# Patient Record
Sex: MALE | Race: WHITE | NOT HISPANIC OR LATINO | Employment: OTHER | URBAN - METROPOLITAN AREA
[De-identification: names, ages, dates, MRNs, and addresses within clinical notes are randomized per-mention and may not be internally consistent; named-entity substitution may affect disease eponyms.]

---

## 2024-07-22 ENCOUNTER — OFFICE VISIT (OUTPATIENT)
Dept: OBGYN CLINIC | Facility: CLINIC | Age: 64
End: 2024-07-22
Payer: COMMERCIAL

## 2024-07-22 VITALS
TEMPERATURE: 98.1 F | OXYGEN SATURATION: 96 % | DIASTOLIC BLOOD PRESSURE: 94 MMHG | BODY MASS INDEX: 27.9 KG/M2 | HEART RATE: 95 BPM | HEIGHT: 72 IN | SYSTOLIC BLOOD PRESSURE: 146 MMHG | WEIGHT: 206 LBS

## 2024-07-22 DIAGNOSIS — M25.511 CHRONIC RIGHT SHOULDER PAIN: ICD-10-CM

## 2024-07-22 DIAGNOSIS — G89.29 CHRONIC RIGHT SHOULDER PAIN: ICD-10-CM

## 2024-07-22 DIAGNOSIS — M75.51 BURSITIS OF RIGHT SHOULDER: Primary | ICD-10-CM

## 2024-07-22 PROCEDURE — 99203 OFFICE O/P NEW LOW 30 MIN: CPT | Performed by: ORTHOPAEDIC SURGERY

## 2024-07-22 PROCEDURE — 20610 DRAIN/INJ JOINT/BURSA W/O US: CPT | Performed by: ORTHOPAEDIC SURGERY

## 2024-07-22 RX ORDER — TRIAMCINOLONE ACETONIDE 40 MG/ML
80 INJECTION, SUSPENSION INTRA-ARTICULAR; INTRAMUSCULAR
Status: COMPLETED | OUTPATIENT
Start: 2024-07-22 | End: 2024-07-22

## 2024-07-22 RX ORDER — BUPIVACAINE HYDROCHLORIDE 2.5 MG/ML
4 INJECTION, SOLUTION INFILTRATION; PERINEURAL
Status: COMPLETED | OUTPATIENT
Start: 2024-07-22 | End: 2024-07-22

## 2024-07-22 RX ADMIN — BUPIVACAINE HYDROCHLORIDE 4 ML: 2.5 INJECTION, SOLUTION INFILTRATION; PERINEURAL at 15:15

## 2024-07-22 RX ADMIN — TRIAMCINOLONE ACETONIDE 80 MG: 40 INJECTION, SUSPENSION INTRA-ARTICULAR; INTRAMUSCULAR at 15:15

## 2024-07-22 NOTE — PROGRESS NOTES
Assessment/Plan:   Diagnoses and all orders for this visit:    Bursitis of right shoulder  -     Large joint arthrocentesis: R subacromial bursa    Chronic right shoulder pain  -     XR shoulder 2+ vw right; Future  -     Large joint arthrocentesis: R subacromial bursa         Discussed with patient that his exam is consistent with bursitis of the right shoulder. He was offered and accepted an injection(s) of Kenalog and Marcaine to his Right shoulder(s) for symptomatic relief of pain and inflammation. Patient tolerated the treatment(s) well. Ice and post injection protocol advised. Weightbearing activities as tolerated. Consideration for further imaging if symptoms do not improve. He will be seen for follow-up in 6 weeks for re-evaluation and consideration for repeat injections as necessary. Patient expresses understanding and is in agreement with this treatment plan. The patient was given the opportunity to ask questions or present concerns.    The patient has evidence of rotator cuff tendinopathy.  There is also impingement.  Under aseptic technique, the shoulder was injected with Kenalog and Marcaine.  He does not want an MRI at this juncture.  Return back in 6 weeks evaluation.  If there is any further issues, an MRI may be warranted      Subjective:   Patient ID: Mj Yates  1960     HPI  Patient is a 63 y.o. male who presents for initial evaluation of his right shoulder. The patient states that he was lying on his right shoulder when he noticed pain in the right shoulder. He states that the pain may have been incited 2 years ago by pulling on a spring machine, however, his shoulder was beginning to feel better before this incident. The patient is a competitive bowler. He states that he is only able to throw the ball approximately 9mph, when on average he throws 14-15mph. He reports feelings on weakness. He states that the he has trouble with overhead activity, pushing, and pulling. His pain is  "the worst with abduction.     The following portions of the patient's history were reviewed and updated as appropriate:  Past medical history, past surgical history, Family history, social history, current medications and allergies    History reviewed. No pertinent past medical history.    History reviewed. No pertinent surgical history.    History reviewed. No pertinent family history.    Social History     Socioeconomic History    Marital status: /Civil Union     Spouse name: None    Number of children: None    Years of education: None    Highest education level: None   Occupational History    None   Tobacco Use    Smoking status: Never    Smokeless tobacco: Never   Substance and Sexual Activity    Alcohol use: Yes     Comment: rare    Drug use: Never    Sexual activity: None   Other Topics Concern    None   Social History Narrative    None     Social Determinants of Health     Financial Resource Strain: Not on file   Food Insecurity: Not on file   Transportation Needs: Not on file   Physical Activity: Not on file   Stress: Not on file   Social Connections: Not on file   Intimate Partner Violence: Not on file   Housing Stability: Not on file       No current outpatient medications on file.    Allergies   Allergen Reactions    Sulfa Antibiotics Other (See Comments)     \"red as a lobster\"    Indomethacin Rash       Review of Systems   Constitutional:  Negative for chills and fever.   HENT:  Negative for ear pain and sore throat.    Eyes:  Negative for pain and visual disturbance.   Respiratory:  Negative for cough and shortness of breath.    Cardiovascular:  Negative for chest pain and palpitations.   Gastrointestinal:  Negative for abdominal pain and vomiting.   Genitourinary:  Negative for dysuria and hematuria.   Musculoskeletal:  Negative for arthralgias and back pain.   Skin:  Negative for color change and rash.   Neurological:  Negative for seizures and syncope.   All other systems reviewed and are " negative.       Objective:  /94   Pulse 95   Temp 98.1 °F (36.7 °C) (Tympanic)   Ht 6' (1.829 m)   Wt 93.4 kg (206 lb)   SpO2 96%   BMI 27.94 kg/m²     Ortho Exam  right Shoulder -   No anatomical deformity  Skin is warm and dry to touch with no signs of erythema, ecchymosis, or infection  No soft tissue swelling or effusion noted  Palpable crepitus with passive motion  TTP over upper trapezius, subacromial bursa   ROM °, ABD 80°, ER 60°  Strength: 4+/5 abduction, 5/5 forward flexion   No glenohumeral instability appreciated on exam  - Neer's, - Bravo  - empty can with pain, - drop-arm, - resisted external rotation, - belly press, - lift-off   Demonstrates normal elbow, wrist, and finger motion  2+  distal radial pulse with brisk capillary refill to the fingers  Radial, median, ulnar and motor  and sensory distribution intact  Sensation to light touch intact distally      Physical Exam  HENT:      Head: Normocephalic and atraumatic.      Nose: Nose normal.   Eyes:      Conjunctiva/sclera: Conjunctivae normal.   Cardiovascular:      Rate and Rhythm: Normal rate.   Pulmonary:      Effort: Pulmonary effort is normal.   Musculoskeletal:      Cervical back: Neck supple.   Skin:     General: Skin is warm and dry.      Capillary Refill: Capillary refill takes less than 2 seconds.   Neurological:      Mental Status: He is alert and oriented to person, place, and time.   Psychiatric:         Mood and Affect: Mood normal.         Behavior: Behavior normal.          Diagnostic Test Review:  X-Ray of right shoulder obtained on 7/22/2024 were reviewed and demonstrate subacromial bone spurs present.      Large joint arthrocentesis: R subacromial bursa  Universal Protocol:  Consent: Verbal consent obtained.  Risks and benefits: risks, benefits and alternatives were discussed  Consent given by: patient  Timeout called at: 7/22/2024 3:30 PM.  Patient understanding: patient states understanding of the procedure  being performed  Patient consent: the patient's understanding of the procedure matches consent given  Site marked: the operative site was marked  Radiology Images displayed and confirmed. If images not available, report reviewed: imaging studies available  Patient identity confirmed: verbally with patient  Supporting Documentation  Indications: pain and joint swelling   Procedure Details  Location: shoulder - R subacromial bursa  Needle gauge: 21 G.  Ultrasound guidance: no  Approach: posterolateral  Medications administered: 4 mL bupivacaine 0.25 %; 80 mg triamcinolone acetonide 40 mg/mL    Patient tolerance: patient tolerated the procedure well with no immediate complications  Dressing:  Sterile dressing applied             Scribe Attestation      I,:  No Shaffer am acting as a scribe while in the presence of the attending physician.:       I,:  Chandu Oliver DO personally performed the services described in this documentation    as scribed in my presence.:

## 2024-09-03 ENCOUNTER — OFFICE VISIT (OUTPATIENT)
Dept: OBGYN CLINIC | Facility: CLINIC | Age: 64
End: 2024-09-03
Payer: COMMERCIAL

## 2024-09-03 ENCOUNTER — TELEPHONE (OUTPATIENT)
Age: 64
End: 2024-09-03

## 2024-09-03 VITALS
WEIGHT: 206 LBS | DIASTOLIC BLOOD PRESSURE: 98 MMHG | SYSTOLIC BLOOD PRESSURE: 162 MMHG | HEIGHT: 72 IN | BODY MASS INDEX: 27.9 KG/M2

## 2024-09-03 DIAGNOSIS — M24.811 INTERNAL DERANGEMENT OF RIGHT SHOULDER: Primary | ICD-10-CM

## 2024-09-03 PROCEDURE — 99213 OFFICE O/P EST LOW 20 MIN: CPT | Performed by: ORTHOPAEDIC SURGERY

## 2024-09-03 NOTE — PROGRESS NOTES
Assessment/Plan:   Diagnoses and all orders for this visit:    Internal derangement of right shoulder  -     MRI shoulder right wo contrast; Future       The patient continues to experience pain, limited motion, and weakness in the right shoulder. An order was placed for an MRI to rule out a rotator cuff tear. Continue activities as tolerated. He may take OTC NSAIDs or Tylenol as needed for pain or soreness. He cindy follow-up once the MRI is complete. The patient expresses understanding and is in agreement with today's treatment plan.     The patient has evidence of a rotator cuff tear of his right shoulder.  There is pain and weakness.  He cannot flex and abduct past his body.  Would recommend obtaining an MRI.  Return back once test is complete          Subjective:   Patient ID: Mj Yates  1960     HPI  Patient is a 63 y.o. male who presents for follow-up evaluation of his right shoulder. The patient was last seen on 7/22/2024 where he received a cortisone injection to his right shoulder. He reports little improvements in pain. He states that his motion slightly approved, however, he continues to struggle with overhead motion. He states that he is able to bowl and lift to elbow height, however, he experiences significant pain and weakness over shoulder height. He denies numbness or tingling.      The following portions of the patient's history were reviewed and updated as appropriate:  Past medical history, past surgical history, Family history, social history, current medications and allergies    History reviewed. No pertinent past medical history.    Past Surgical History:   Procedure Laterality Date    ACHILLES TENDON REPAIR Right     approx 2009       History reviewed. No pertinent family history.    Social History     Socioeconomic History    Marital status: /Civil Union     Spouse name: None    Number of children: None    Years of education: None    Highest education level: None  "  Occupational History    None   Tobacco Use    Smoking status: Never    Smokeless tobacco: Never   Vaping Use    Vaping status: Never Used   Substance and Sexual Activity    Alcohol use: Yes     Comment: rare    Drug use: Never    Sexual activity: None   Other Topics Concern    None   Social History Narrative    None     Social Determinants of Health     Financial Resource Strain: Not on file   Food Insecurity: Not on file   Transportation Needs: Not on file   Physical Activity: Not on file   Stress: Not on file   Social Connections: Not on file   Intimate Partner Violence: Not on file   Housing Stability: Not on file       No current outpatient medications on file.    Allergies   Allergen Reactions    Sulfa Antibiotics Other (See Comments)     \"red as a lobster\"    Indomethacin Rash       Review of Systems   Constitutional:  Negative for chills and fever.   HENT:  Negative for ear pain and sore throat.    Eyes:  Negative for pain and visual disturbance.   Respiratory:  Negative for cough and shortness of breath.    Cardiovascular:  Negative for chest pain and palpitations.   Gastrointestinal:  Negative for abdominal pain and vomiting.   Genitourinary:  Negative for dysuria and hematuria.   Musculoskeletal:  Negative for arthralgias and back pain.   Skin:  Negative for color change and rash.   Neurological:  Negative for seizures and syncope.   All other systems reviewed and are negative.       Objective:  /98 (BP Location: Left arm, Patient Position: Sitting, Cuff Size: Large)   Ht 6' (1.829 m)   Wt 93.4 kg (206 lb) Comment: reported  BMI 27.94 kg/m²     Ortho Exam  right Shoulder -   No anatomical deformity  Skin is warm and dry to touch with no signs of erythema, ecchymosis, or infection  No soft tissue swelling or effusion noted  Palpable crepitus with passive motion  TTP over upper trapezius, subacromial bursa   ROM °, °, ER 60°  Strength: 4+/5 abduction, 5/5 forward flexion   No " glenohumeral instability appreciated on exam  - Neer's, - Bravo  + pain empty can with pain, - drop-arm, + resisted external rotation, - belly press, - lift-off   Demonstrates normal elbow, wrist, and finger motion  2+  distal radial pulse with brisk capillary refill to the fingers  Radial, median, ulnar and motor  and sensory distribution intact  Sensation to light touch intact distally      Physical Exam  HENT:      Head: Normocephalic and atraumatic.      Nose: Nose normal.   Eyes:      Conjunctiva/sclera: Conjunctivae normal.   Cardiovascular:      Rate and Rhythm: Normal rate.   Pulmonary:      Effort: Pulmonary effort is normal.   Musculoskeletal:      Cervical back: Neck supple.   Skin:     General: Skin is warm and dry.      Capillary Refill: Capillary refill takes less than 2 seconds.   Neurological:      Mental Status: He is alert and oriented to person, place, and time.   Psychiatric:         Mood and Affect: Mood normal.         Behavior: Behavior normal.          Diagnostic Test Review:  X-Ray of right shoulder obtained on 7/22/2024 were reviewed and demonstrate subacromial bone spurs present.        Scribe Attestation      I,:  No Shaffer am acting as a scribe while in the presence of the attending physician.:       I,:  Chandu Oliver, DO personally performed the services described in this documentation    as scribed in my presence.:

## 2024-09-19 ENCOUNTER — HOSPITAL ENCOUNTER (OUTPATIENT)
Dept: MRI IMAGING | Facility: HOSPITAL | Age: 64
End: 2024-09-19
Attending: ORTHOPAEDIC SURGERY
Payer: COMMERCIAL

## 2024-09-19 DIAGNOSIS — M24.811 INTERNAL DERANGEMENT OF RIGHT SHOULDER: ICD-10-CM

## 2024-09-19 PROCEDURE — 73221 MRI JOINT UPR EXTREM W/O DYE: CPT

## 2024-09-30 ENCOUNTER — OFFICE VISIT (OUTPATIENT)
Dept: OBGYN CLINIC | Facility: CLINIC | Age: 64
End: 2024-09-30
Payer: COMMERCIAL

## 2024-09-30 VITALS
HEIGHT: 72 IN | SYSTOLIC BLOOD PRESSURE: 171 MMHG | WEIGHT: 206 LBS | DIASTOLIC BLOOD PRESSURE: 110 MMHG | BODY MASS INDEX: 27.9 KG/M2 | HEART RATE: 80 BPM

## 2024-09-30 DIAGNOSIS — M24.811 INTERNAL DERANGEMENT OF RIGHT SHOULDER: Primary | ICD-10-CM

## 2024-09-30 DIAGNOSIS — M19.011 ARTHRITIS OF RIGHT SHOULDER REGION: ICD-10-CM

## 2024-09-30 PROCEDURE — 99213 OFFICE O/P EST LOW 20 MIN: CPT | Performed by: ORTHOPAEDIC SURGERY

## 2024-09-30 NOTE — PROGRESS NOTES
Assessment/Plan:   Diagnoses and all orders for this visit:    Internal derangement of right shoulder    Arthritis of right shoulder region       Reviewed with patient at time of visit that physical exam and imaging demonstrate bone spur and foreign body in Right shoulder. MRI also shows mild arthritis. Since patient states that the shoulder seems to have improved slightly, he would like to monitor symptoms at this point.     The patient has arthritis of his right shoulder with loose bodies.  After looking at the MRI, there is no rotator cuff tearing.  The patient states he is feeling better with his motion.  At this point would continue to monitor.  Follow-up on an as-needed basis.  If his condition changes, he would not hesitate to let us know    Subjective:   Patient ID: Mj Yates  1960     HPI  Patient is a 63 y.o. male who presents for follow up evaluation of right shoulder. He obtained an MRI on 9/19/2024.  Today, he notes that the pain has improved. He is able to lift his arm and has good strength.     The following portions of the patient's history were reviewed and updated as appropriate:  Past medical history, past surgical history, Family history, social history, current medications and allergies    History reviewed. No pertinent past medical history.    Past Surgical History:   Procedure Laterality Date    ACHILLES TENDON REPAIR Right     approx 2009       History reviewed. No pertinent family history.    Social History     Socioeconomic History    Marital status: /Civil Union     Spouse name: None    Number of children: None    Years of education: None    Highest education level: None   Occupational History    None   Tobacco Use    Smoking status: Never    Smokeless tobacco: Never   Vaping Use    Vaping status: Never Used   Substance and Sexual Activity    Alcohol use: Yes     Comment: rare    Drug use: Never    Sexual activity: None   Other Topics Concern    None   Social History  "Narrative    None     Social Determinants of Health     Financial Resource Strain: Not on file   Food Insecurity: Not on file   Transportation Needs: Not on file   Physical Activity: Not on file   Stress: Not on file   Social Connections: Not on file   Intimate Partner Violence: Not on file   Housing Stability: Not on file       No current outpatient medications on file.    Allergies   Allergen Reactions    Sulfa Antibiotics Other (See Comments)     \"red as a lobster\"    Indomethacin Rash       Review of Systems   Constitutional:  Negative for chills and fever.   HENT:  Negative for ear pain and sore throat.    Eyes:  Negative for pain and visual disturbance.   Respiratory:  Negative for cough and shortness of breath.    Cardiovascular:  Negative for chest pain and palpitations.   Gastrointestinal:  Negative for abdominal pain and vomiting.   Genitourinary:  Negative for dysuria and hematuria.   Musculoskeletal:  Negative for arthralgias and back pain.   Skin:  Negative for color change and rash.   Neurological:  Negative for seizures and syncope.   All other systems reviewed and are negative.       Objective:  BP (!) 171/110 Comment: not taking BP med  Pulse 80   Ht 6' (1.829 m)   Wt 93.4 kg (206 lb)   BMI 27.94 kg/m²     Ortho Exam  right Shoulder -   No anatomical deformity  Skin is warm and dry to touch with no signs of erythema, ecchymosis, or infection  No soft tissue swelling or effusion noted  Palpable crepitus with passive motion  TTP over subacromial bursa   ROM °, °, ER 75°  Strength: 5/5 throughout   No glenohumeral instability appreciated on exam  - Neer's, - Bravo   - O'toney's, - empty can, - drop-arm,   Demonstrates normal elbow, wrist, and finger motion  2+  distal radial pulse with brisk capillary refill to the fingers  Radial, median, ulnar and motor  and sensory distribution intact  Sensation to light touch intact distally      Physical Exam  Vitals and nursing note reviewed. "   Constitutional:       General: He is not in acute distress.     Appearance: He is well-developed.   HENT:      Head: Normocephalic and atraumatic.   Eyes:      Conjunctiva/sclera: Conjunctivae normal.   Cardiovascular:      Rate and Rhythm: Normal rate and regular rhythm.      Heart sounds: No murmur heard.  Pulmonary:      Effort: Pulmonary effort is normal. No respiratory distress.      Breath sounds: Normal breath sounds.   Abdominal:      Palpations: Abdomen is soft.      Tenderness: There is no abdominal tenderness.   Musculoskeletal:         General: No swelling.      Cervical back: Neck supple.   Skin:     General: Skin is warm and dry.      Capillary Refill: Capillary refill takes less than 2 seconds.   Neurological:      Mental Status: He is alert.   Psychiatric:         Mood and Affect: Mood normal.          Diagnostic Test Review:  MRI of right shoulder obtained on 9/19/2024 were reviewed and demonstrate  loose body and bone spurs . Mid arthritis      Procedures   None performed at today's visit    Scribe Attestation      I,:  Traci Hicks am acting as a scribe while in the presence of the attending physician.:       I,:  Chandu Oliver DO personally performed the services described in this documentation    as scribed in my presence.:

## 2025-05-21 ENCOUNTER — OFFICE VISIT (OUTPATIENT)
Dept: SURGERY | Facility: CLINIC | Age: 65
End: 2025-05-21
Payer: COMMERCIAL

## 2025-05-21 VITALS
HEIGHT: 72 IN | BODY MASS INDEX: 25.73 KG/M2 | TEMPERATURE: 98.1 F | HEART RATE: 84 BPM | WEIGHT: 190 LBS | DIASTOLIC BLOOD PRESSURE: 74 MMHG | OXYGEN SATURATION: 99 % | SYSTOLIC BLOOD PRESSURE: 126 MMHG

## 2025-05-21 DIAGNOSIS — K40.91 RECURRENT LEFT INGUINAL HERNIA: Primary | ICD-10-CM

## 2025-05-21 DIAGNOSIS — E08.69 DIABETES MELLITUS DUE TO UNDERLYING CONDITION WITH OTHER SPECIFIED COMPLICATION, WITH LONG-TERM CURRENT USE OF INSULIN (HCC): ICD-10-CM

## 2025-05-21 DIAGNOSIS — Z79.4 DIABETES MELLITUS DUE TO UNDERLYING CONDITION WITH OTHER SPECIFIED COMPLICATION, WITH LONG-TERM CURRENT USE OF INSULIN (HCC): ICD-10-CM

## 2025-05-21 PROCEDURE — 99204 OFFICE O/P NEW MOD 45 MIN: CPT | Performed by: SURGERY

## 2025-05-21 RX ORDER — SODIUM CHLORIDE, SODIUM LACTATE, POTASSIUM CHLORIDE, CALCIUM CHLORIDE 600; 310; 30; 20 MG/100ML; MG/100ML; MG/100ML; MG/100ML
125 INJECTION, SOLUTION INTRAVENOUS CONTINUOUS
OUTPATIENT
Start: 2025-05-21

## 2025-05-21 RX ORDER — BLOOD SUGAR DIAGNOSTIC
STRIP MISCELLANEOUS
COMMUNITY
Start: 2025-04-23

## 2025-05-21 RX ORDER — BLOOD-GLUCOSE METER
EACH MISCELLANEOUS
COMMUNITY
Start: 2025-03-15

## 2025-05-21 RX ORDER — LANCETS 30 GAUGE
EACH MISCELLANEOUS
COMMUNITY
Start: 2025-03-14

## 2025-05-21 NOTE — ASSESSMENT & PLAN NOTE
The patient is a pleasant 64-year-old male with a past medical history significant for diabetes mellitus presenting with a recurrent left inguinal hernia status post open repair in 2005 that did not [facility] for definitive treatment by laparoscopic mesh repair.    The options, benefits, risks and alternatives to laparoscopic inguinal herniorrhaphy with mesh were reviewed with the patient.    The patient understands that nonsurgical management for asymptomatic and minimally symptomatic patients is a reasonable alternative to surgery.    Specific risks related to anesthesia, bleeding, infection, the use of mesh, 1 to 3% lifetime risk of recurrence and 1% risk of iatrogenic bowel injury were reviewed with the patient.  The possibility of transitioning to a open repair was also discussed.    All questions answered to the satisfaction of the patient and informed written consent obtained to proceed.

## 2025-05-21 NOTE — PROGRESS NOTES
Name: Mj Yates      : 1960      MRN: [id number]  Encounter Provider: Meek Leon MD  Encounter Date: 2025   Encounter department: [facility] SURGERY [place]  :  Assessment & Plan  Recurrent left inguinal hernia  The patient is a pleasant 64-year-old male with a past medical history significant for diabetes mellitus presenting with a recurrent left inguinal hernia status post open repair in  that did not [facility] for definitive treatment by laparoscopic mesh repair.    The options, benefits, risks and alternatives to laparoscopic inguinal herniorrhaphy with mesh were reviewed with the patient.    The patient understands that nonsurgical management for asymptomatic and minimally symptomatic patients is a reasonable alternative to surgery.    Specific risks related to anesthesia, bleeding, infection, the use of mesh, 1 to 3% lifetime risk of recurrence and 1% risk of iatrogenic bowel injury were reviewed with the patient.  The possibility of transitioning to a open repair was also discussed.    All questions answered to the satisfaction of the patient and informed written consent obtained to proceed.               History of Present Illness   HPI  Mj Yates is a 64 y.o. male who presents for a left ing hernia that he has been dealing with for 1 year but in the last 2 mo the bulge has gotten bigger with pulling, burning, and stabbing pains. Pt states with lifting the pain worsens. Pt denies any discoloration to the groin skin and denies any issues with the testicle. Pt denies nausea/vomiting, diarrhea/ constipation, issues with urination, abd pain, trouble eating/drinking/swallowing or fevers/chills. Pt states he had a right or left ing hernia rpr when he was younger he doesn't remember which one. Pt denies any blood thinners but has allergies to Sulfa abx and Indomethacin. Sylvia.NERY,MA  History obtained from: patient    Review of Systems   Constitutional:   Negative for chills and fever.   HENT:  Negative for ear pain and sore throat.    Eyes:  Negative for pain and visual disturbance.   Respiratory:  Negative for cough and shortness of breath.    Cardiovascular:  Negative for chest pain and palpitations.   Gastrointestinal:  Negative for abdominal pain and vomiting.   Genitourinary:  Negative for dysuria and hematuria.   Musculoskeletal:  Negative for arthralgias and back pain.   Skin:  Negative for color change and rash.   Neurological:  Negative for seizures and syncope.   All other systems reviewed and are negative.    Pertinent Medical History            Medical History Reviewed by provider this encounter:     .  Past Medical History   Past Medical History:   Diagnosis Date    Diabetes (HCC)      Past Surgical History:   Procedure Laterality Date    ACHILLES TENDON REPAIR Right     approx 2009    APPENDECTOMY      1972     Family History   Problem Relation Age of Onset    No Known Problems Mother     Bone cancer Father     No Known Problems Son     No Known Problems Son       reports that he has never smoked. He has never used smokeless tobacco. He reports current alcohol use. He reports that he does not use drugs.  Current Outpatient Medications   Medication Instructions    Blood Glucose Monitoring Suppl (ONE TOUCH ULTRA 2) w/Device KIT use as directed TO TEST ONCE DAILY    Lancets (OneTouch Delica Plus [id number]) MISC use 1 LANCET to TEST BLOOD SUGAR once daily    metFORMIN (GLUCOPHAGE) 500 mg tablet 1 tablet, Oral, 2 times daily    OneTouch Ultra Test test strip USE ONE TEST STRIP TO TEST BLOOD SUGAR ONCE DAILY AS DIRECTED BY PRESCRIBER   Allergies[1]   Medications Ordered Prior to Encounter[2]   Social History     Tobacco Use    Smoking status: Never    Smokeless tobacco: Never   Vaping Use    Vaping status: Never Used   Substance and Sexual Activity    Alcohol use: Yes     Comment: rare    Drug use: Never    Sexual activity: Not on file        Objective   BP  "126/74 (BP Location: Left arm, Patient Position: Sitting, Cuff Size: Standard)   Pulse 84   Temp 98.1 °F (36.7 °C) (Temporal)   Ht 6' (1.829 m)   Wt 86.2 kg (190 lb)   SpO2 99%   BMI 25.77 kg/m²      Physical Exam  Vitals and nursing note reviewed.   Constitutional:       General: He is not in acute distress.     Appearance: He is well-developed.   HENT:      Head: Normocephalic and atraumatic.     Eyes:      Conjunctiva/sclera: Conjunctivae normal.       Cardiovascular:      Rate and Rhythm: Normal rate and regular rhythm.      Heart sounds: No murmur heard.  Pulmonary:      Effort: Pulmonary effort is normal. No respiratory distress.      Breath sounds: Normal breath sounds.   Abdominal:      Palpations: Abdomen is soft.      Tenderness: There is no abdominal tenderness.      Comments: Moderate-sized reducible left inguinal hernia present   Genitourinary:     Comments: Testes descended bilaterally    Musculoskeletal:         General: No swelling.      Cervical back: Neck supple.     Skin:     General: Skin is warm and dry.      Capillary Refill: Capillary refill takes less than 2 seconds.     Neurological:      Mental Status: He is alert.     Psychiatric:         Mood and Affect: Mood normal.         Administrative Statements   I have spent a total time of 20 minutes in caring for this patient on the day of the visit/encounter including Risks and benefits of tx options.       [1]   Allergies  Allergen Reactions    Sulfa Antibiotics Other (See Comments)     \"red as a lobster\"    Indomethacin Rash   [2]   Current Outpatient Medications on File Prior to Visit   Medication Sig Dispense Refill    Blood Glucose Monitoring Suppl (ONE TOUCH ULTRA 2) w/Device KIT use as directed TO TEST ONCE DAILY      Lancets (OneTouch Delica Plus [id number]) MISC use 1 LANCET to TEST BLOOD SUGAR once daily      metFORMIN (GLUCOPHAGE) 500 mg tablet Take 1 tablet by mouth in the morning and 1 tablet before bedtime.      OneTouch Ultra " Test test strip USE ONE TEST STRIP TO TEST BLOOD SUGAR ONCE DAILY AS DIRECTED BY PRESCRIBER       No current facility-administered medications on file prior to visit.

## 2025-05-21 NOTE — H&P
Name: Mj Yates      : 1960      MRN: [id number]  Encounter Provider: Meek Leon MD  Encounter Date: 2025   Encounter department: [facility] SURGERY [place]  :  Assessment & Plan  Recurrent left inguinal hernia  The patient is a pleasant 64-year-old male with a past medical history significant for diabetes mellitus presenting with a recurrent left inguinal hernia status post open repair in  that did not [facility] for definitive treatment by laparoscopic mesh repair.    The options, benefits, risks and alternatives to laparoscopic inguinal herniorrhaphy with mesh were reviewed with the patient.    The patient understands that nonsurgical management for asymptomatic and minimally symptomatic patients is a reasonable alternative to surgery.    Specific risks related to anesthesia, bleeding, infection, the use of mesh, 1 to 3% lifetime risk of recurrence and 1% risk of iatrogenic bowel injury were reviewed with the patient.  The possibility of transitioning to a open repair was also discussed.    All questions answered to the satisfaction of the patient and informed written consent obtained to proceed.               History of Present Illness  HPI  Mj Yates is a 64 y.o. male who presents for a left ing hernia that he has been dealing with for 1 year but in the last 2 mo the bulge has gotten bigger with pulling, burning, and stabbing pains. Pt states with lifting the pain worsens. Pt denies any discoloration to the groin skin and denies any issues with the testicle. Pt denies nausea/vomiting, diarrhea/ constipation, issues with urination, abd pain, trouble eating/drinking/swallowing or fevers/chills. Pt states he had a right or left ing hernia rpr when he was younger he doesn't remember which one. Pt denies any blood thinners but has allergies to Sulfa abx and Indomethacin. Sylvia.NERY,MA  History obtained from: patient    Review of Systems   Constitutional:   Negative for chills and fever.   HENT:  Negative for ear pain and sore throat.    Eyes:  Negative for pain and visual disturbance.   Respiratory:  Negative for cough and shortness of breath.    Cardiovascular:  Negative for chest pain and palpitations.   Gastrointestinal:  Negative for abdominal pain and vomiting.   Genitourinary:  Negative for dysuria and hematuria.   Musculoskeletal:  Negative for arthralgias and back pain.   Skin:  Negative for color change and rash.   Neurological:  Negative for seizures and syncope.   All other systems reviewed and are negative.    Pertinent Medical History           Medical History Reviewed by provider this encounter:     .  Past Medical History  Past Medical History:   Diagnosis Date    Diabetes (HCC)      Past Surgical History:   Procedure Laterality Date    ACHILLES TENDON REPAIR Right     approx 2009    APPENDECTOMY      1972     Family History   Problem Relation Age of Onset    No Known Problems Mother     Bone cancer Father     No Known Problems Son     No Known Problems Son       reports that he has never smoked. He has never used smokeless tobacco. He reports current alcohol use. He reports that he does not use drugs.  Current Outpatient Medications   Medication Instructions    Blood Glucose Monitoring Suppl (ONE TOUCH ULTRA 2) w/Device KIT use as directed TO TEST ONCE DAILY    Lancets (OneTouch Delica Plus [id number]) MISC use 1 LANCET to TEST BLOOD SUGAR once daily    metFORMIN (GLUCOPHAGE) 500 mg tablet 1 tablet, Oral, 2 times daily    OneTouch Ultra Test test strip USE ONE TEST STRIP TO TEST BLOOD SUGAR ONCE DAILY AS DIRECTED BY PRESCRIBER   Allergies[1]   Medications Ordered Prior to Encounter[2]   Social History     Tobacco Use    Smoking status: Never    Smokeless tobacco: Never   Vaping Use    Vaping status: Never Used   Substance and Sexual Activity    Alcohol use: Yes     Comment: rare    Drug use: Never    Sexual activity: Not on file        Objective  BP  "126/74 (BP Location: Left arm, Patient Position: Sitting, Cuff Size: Standard)   Pulse 84   Temp 98.1 °F (36.7 °C) (Temporal)   Ht 6' (1.829 m)   Wt 86.2 kg (190 lb)   SpO2 99%   BMI 25.77 kg/m²      Physical Exam  Vitals and nursing note reviewed.   Constitutional:       General: He is not in acute distress.     Appearance: He is well-developed.   HENT:      Head: Normocephalic and atraumatic.     Eyes:      Conjunctiva/sclera: Conjunctivae normal.       Cardiovascular:      Rate and Rhythm: Normal rate and regular rhythm.      Heart sounds: No murmur heard.  Pulmonary:      Effort: Pulmonary effort is normal. No respiratory distress.      Breath sounds: Normal breath sounds.   Abdominal:      Palpations: Abdomen is soft.      Tenderness: There is no abdominal tenderness.      Comments: Moderate-sized reducible left inguinal hernia present   Genitourinary:     Comments: Testes descended bilaterally    Musculoskeletal:         General: No swelling.      Cervical back: Neck supple.     Skin:     General: Skin is warm and dry.      Capillary Refill: Capillary refill takes less than 2 seconds.     Neurological:      Mental Status: He is alert.     Psychiatric:         Mood and Affect: Mood normal.         Administrative Statements  I have spent a total time of 20 minutes in caring for this patient on the day of the visit/encounter including Risks and benefits of tx options.           [1]   Allergies  Allergen Reactions    Sulfa Antibiotics Other (See Comments)     \"red as a lobster\"    Indomethacin Rash   [2]   Current Outpatient Medications on File Prior to Visit   Medication Sig Dispense Refill    Blood Glucose Monitoring Suppl (ONE TOUCH ULTRA 2) w/Device KIT use as directed TO TEST ONCE DAILY      Lancets (OneTouch Delica Plus [id number]) MISC use 1 LANCET to TEST BLOOD SUGAR once daily      metFORMIN (GLUCOPHAGE) 500 mg tablet Take 1 tablet by mouth in the morning and 1 tablet before bedtime.      OneTouch " Ultra Test test strip USE ONE TEST STRIP TO TEST BLOOD SUGAR ONCE DAILY AS DIRECTED BY PRESCRIBER       No current facility-administered medications on file prior to visit.

## 2025-06-27 ENCOUNTER — OFFICE VISIT (OUTPATIENT)
Dept: LAB | Facility: HOSPITAL | Age: 65
End: 2025-06-27
Payer: COMMERCIAL

## 2025-06-27 DIAGNOSIS — K40.91 RECURRENT LEFT INGUINAL HERNIA: ICD-10-CM

## 2025-06-27 PROCEDURE — 93005 ELECTROCARDIOGRAM TRACING: CPT

## 2025-06-28 LAB
ATRIAL RATE: 85 BPM
P AXIS: 69 DEGREES
PR INTERVAL: 186 MS
QRS AXIS: -17 DEGREES
QRSD INTERVAL: 80 MS
QT INTERVAL: 356 MS
QTC INTERVAL: 424 MS
T WAVE AXIS: 68 DEGREES
VENTRICULAR RATE: 85 BPM

## 2025-06-28 PROCEDURE — 93010 ELECTROCARDIOGRAM REPORT: CPT | Performed by: INTERNAL MEDICINE

## 2025-07-01 ENCOUNTER — APPOINTMENT (OUTPATIENT)
Dept: LAB | Facility: HOSPITAL | Age: 65
End: 2025-07-01
Attending: PHYSICIAN ASSISTANT
Payer: COMMERCIAL

## 2025-07-01 ENCOUNTER — TELEPHONE (OUTPATIENT)
Age: 65
End: 2025-07-01

## 2025-07-01 DIAGNOSIS — R94.31 ABNORMAL EKG: Primary | ICD-10-CM

## 2025-07-01 DIAGNOSIS — R94.31 ABNORMAL EKG: ICD-10-CM

## 2025-07-01 NOTE — TELEPHONE ENCOUNTER
Patient returning call. Advised of note for repeat ECG.  Patient agreeable and he will return call once he has it completed.  No further questions.

## 2025-07-01 NOTE — TELEPHONE ENCOUNTER
"REASON FOR CONVERSATION: Pre-op Exam    SYMPTOMS: PT had EKG completed today that showed \"abnormal\" result. Advised by PCP to follow-up with surgeon who ordered EKG and ask if he also needs cardiology consult. PT offers no complaints including CP/SOB/palpitations.    PT also asking if he should still have blood work drawn as scheduled x2 weeks prior to scheduled surgery.    OTHER HEALTH INFORMATION: PT scheduled for Procedure: REPAIR HERNIA INGUINAL, LAPAROSCOPIC (Left: Groin) with Dr. Leon on 7/24/25.    PROTOCOL DISPOSITION: Discuss with Provider and Call Back Patient    CARE ADVICE PROVIDED: Advised PT RN will update Dr. Leon on EKG. Also advised to continue with plan to have bloodwork drawn.     PRACTICE FOLLOW-UP: call back: [phone]        "

## 2025-07-02 ENCOUNTER — TELEPHONE (OUTPATIENT)
Dept: SURGERY | Facility: CLINIC | Age: 65
End: 2025-07-02

## 2025-07-02 LAB
ATRIAL RATE: 88 BPM
P AXIS: 66 DEGREES
PR INTERVAL: 180 MS
QRS AXIS: -7 DEGREES
QRSD INTERVAL: 84 MS
QT INTERVAL: 348 MS
QTC INTERVAL: 421 MS
T WAVE AXIS: 66 DEGREES
VENTRICULAR RATE: 88 BPM

## 2025-07-02 PROCEDURE — 93010 ELECTROCARDIOGRAM REPORT: CPT | Performed by: INTERNAL MEDICINE

## 2025-07-02 NOTE — TELEPHONE ENCOUNTER
Repeat ECG completed showing same finding of possible inferior infarct. Without acute symptoms of ACS. Will make referral to cardiology for outpatient consult and clearance prior to planned surgery.

## 2025-07-02 NOTE — TELEPHONE ENCOUNTER
Spoke to Miesha regarding abnormal ECG findings x 2 and rationale for Cardiology evaluation prior to elective hernia surgery. All questions answered. Agreeable to the plan.

## 2025-07-07 ENCOUNTER — OFFICE VISIT (OUTPATIENT)
Dept: CARDIOLOGY CLINIC | Facility: CLINIC | Age: 65
End: 2025-07-07
Attending: PHYSICIAN ASSISTANT
Payer: COMMERCIAL

## 2025-07-07 VITALS
DIASTOLIC BLOOD PRESSURE: 78 MMHG | TEMPERATURE: 97.5 F | WEIGHT: 181 LBS | HEART RATE: 71 BPM | HEIGHT: 72 IN | OXYGEN SATURATION: 98 % | SYSTOLIC BLOOD PRESSURE: 126 MMHG | BODY MASS INDEX: 24.52 KG/M2

## 2025-07-07 DIAGNOSIS — R94.31 ABNORMAL EKG: ICD-10-CM

## 2025-07-07 DIAGNOSIS — Z01.810 PREOPERATIVE CARDIOVASCULAR EXAMINATION: Primary | ICD-10-CM

## 2025-07-07 DIAGNOSIS — E78.00 PURE HYPERCHOLESTEROLEMIA: ICD-10-CM

## 2025-07-07 PROCEDURE — 99204 OFFICE O/P NEW MOD 45 MIN: CPT | Performed by: INTERNAL MEDICINE

## 2025-07-07 NOTE — PROGRESS NOTES
[facility] [place]  [address]  [address]  [facility] [id number] [phone]      Patient name: Mj Yates   YOB: 1960   MR no: [id number]        Diagnosis ICD-10-CM Associated Orders   1. Preoperative cardiovascular examination  Z01.810       2. Abnormal EKG  R94.31 Ambulatory Referral to Cardiology     Echo complete w/ contrast if indicated      3. Pure hypercholesterolemia  E78.00            Assessment and Recommendations:    1. Preoperative cardiovascular examination  2. Abnormal EKG  -     Ambulatory Referral to Cardiology  -     Echo complete w/ contrast if indicated; Future; Expected date: 07/07/2025  3. Pure hypercholesterolemia     Patient can easily achieve 5 METS without any cardiac symptoms.  No additional cardiac testing is indicated for the inguinal hernia repair surgery.  Patient is low risk from cardiac standpoint.  His ECG very likely shows false positive inferior infarct pattern.  We will get an echocardiogram for clarification.  His upcoming surgery does not need to be delayed for the echocardiogram.  Recent labs from March 2025 showed mild pure hypercholesterolemia.  Patient states that he was tried on multiple statins and could not tolerate because of severe myalgias.  I would advise rechecking his lipid profile in a few months, since he has lost a lot of weight and if his LDL is persistently above 100 mg/dL, consider ezetimibe or Nexletol.  Will defer that to his primary care physician.  Return to cardiology as needed.    CHIEF COMPLAINT:  Abnormal ECG    HPI:  64-year-old male with past medical history significant for recently diagnosed type 2 diabetes mellitus, presents for his first visit to [facility] cardiology prior to hernia repair surgery.  Patient reports that he had no symptoms and just went for a routine wellness visit a few months ago and was diagnosed with type 2 diabetes mellitus and started on  "metformin.  He lost all his appetite and lost nearly 20 to 30 pounds and metformin dose was reduced to once a day with better tolerability.  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  He reports vague symptoms and a presumptive diagnosis of TIA nearly more than 20 years ago and was given Aggrenox at that time which caused severe headaches and eventually was stopped and no neurological pathology was found.    Past Medical History[1]     Past Surgical History[2]     Social History[3]    Family History[4]     Allergies   Allergen Reactions    Sulfa Antibiotics Other (See Comments)     \"red as a lobster\"    Indomethacin Rash       Current Medications[5]     Lab Results   Component Value Date    CREATININE 0.96 03/03/2025    K 4.8 03/03/2025    SODIUM 142 03/03/2025       I have personally reviewed the ECG from July 1, 2025 and June 27, 2025 which showed normal sinus rhythm with possible old inferior infarct.      REVIEW OF SYSTEMS   Positive for: Inguinal hernia  Negative for: All remaining as reviewed below and in HPI.    SYSTEM SYMPTOMS REVIEWED:  General--weight change, fever, night sweats  Respiratory--cough, wheezing, shortness of breath, sputum production  Cardiovascular--chest pain, syncope, dyspnea on exertion, edema, decline in exercise tolerance, claudication   Gastrointestinal--persistent vomiting, diarrhea, abdominal distention, blood in stool   Muscular or skeletal--joint pain or swelling   Neurologic--headaches, syncope, abnormal movement  Hematologic--history of easy bruising and bleeding   Endocrine--thyroid enlargement, heat or cold intolerance, polyuria   Psychiatric--anxiety, depression     Vitals:    07/07/25 1320   BP: 126/78   Pulse: 71   Temp: 97.5 °F (36.4 °C)   SpO2: 98%   Weight: 82.1 kg (181 lb)   Height: 6' (1.829 m)       Wt Readings from Last 3 Encounters:   07/07/25 82.1 kg (181 lb)   05/21/25 86.2 kg (190 " "lb)   09/30/24 93.4 kg (206 lb)        Body mass index is 24.55 kg/m².     General physical examination:    General appearance: Alert, no acute distress, appears stated age.  Normal weight  HEENT: Mucous membranes are moist.  No obvious abnormality noted.  Neck: Supple with no lymphadenopathy.  No JVD.  Carotid pulses are intact.  No carotid bruit.  Cardiovascular system: Regular rhythm.  Normal S1 and S2.  No murmurs.  No rubs or gallops. Extremities: No edema. No cyanosis.  Pulmonary: Respirations unlabored.  Good air entry bilaterally.  Clear to auscultation bilaterally.  Gastrointestinal: Abdomen is soft and nontender.  Bowel sounds are positive.  Musculoskeletal: Upper Extremities: Normal upper motor strength. Lower Extremity: Normal motor strength. Gait: Normal.   Skin: Skin is warm. No rashes or lesions.  Neurological: Patient is alert and oriented with no gross motor deficits.  Psychiatric: Mood is normal.  Behavior is normal.        Thank you for allowing me to be a part of this patient's care. If you have further questions, please feel free to contact me.    Dc Quinones MD, FACC, DEVANG    Portions of the record  have been created with voice recognition software.  Occasional grammatical mistakes or wrong word or \"sound alike\" substitutions may have occurred due to the inherent limitations of voice recognition software. Please reach out to me directly for any clarifications.          [1]   Past Medical History:  Diagnosis Date    Diabetes (HCC)    [2]   Past Surgical History:  Procedure Laterality Date    ACHILLES TENDON REPAIR Right     approx 2009    APPENDECTOMY      1972   [3]   Social History  Tobacco Use    Smoking status: Never    Smokeless tobacco: Never   Vaping Use    Vaping status: Never Used   Substance Use Topics    Alcohol use: Yes     Comment: rare    Drug use: Never   [4]   Family History  Problem Relation Name Age of Onset    No Known Problems Mother      Bone cancer Father      No Known " Problems Son      No Known Problems Son     [5]   Current Outpatient Medications:     Blood Glucose Monitoring Suppl (ONE TOUCH ULTRA 2) w/Device KIT, use as directed TO TEST ONCE DAILY, Disp: , Rfl:     Lancets (OneTouch Delica Plus [id number]) MISC, use 1 LANCET to TEST BLOOD SUGAR once daily, Disp: , Rfl:     metFORMIN (GLUCOPHAGE) 500 mg tablet, Take 1 tablet by mouth daily with breakfast, Disp: , Rfl:     OneTouch Ultra Test test strip, USE ONE TEST STRIP TO TEST BLOOD SUGAR ONCE DAILY AS DIRECTED BY PRESCRIBER, Disp: , Rfl:

## 2025-07-07 NOTE — H&P (VIEW-ONLY)
[facility] [place]  [address]  [address]  [facility] [id number] [phone]      Patient name: Mj Yates   YOB: 1960   MR no: [id number]        Diagnosis ICD-10-CM Associated Orders   1. Preoperative cardiovascular examination  Z01.810       2. Abnormal EKG  R94.31 Ambulatory Referral to Cardiology     Echo complete w/ contrast if indicated      3. Pure hypercholesterolemia  E78.00            Assessment and Recommendations:    1. Preoperative cardiovascular examination  2. Abnormal EKG  -     Ambulatory Referral to Cardiology  -     Echo complete w/ contrast if indicated; Future; Expected date: 07/07/2025  3. Pure hypercholesterolemia     Patient can easily achieve 5 METS without any cardiac symptoms.  No additional cardiac testing is indicated for the inguinal hernia repair surgery.  Patient is low risk from cardiac standpoint.  His ECG very likely shows false positive inferior infarct pattern.  We will get an echocardiogram for clarification.  His upcoming surgery does not need to be delayed for the echocardiogram.  Recent labs from March 2025 showed mild pure hypercholesterolemia.  Patient states that he was tried on multiple statins and could not tolerate because of severe myalgias.  I would advise rechecking his lipid profile in a few months, since he has lost a lot of weight and if his LDL is persistently above 100 mg/dL, consider ezetimibe or Nexletol.  Will defer that to his primary care physician.  Return to cardiology as needed.    CHIEF COMPLAINT:  Abnormal ECG    HPI:  64-year-old male with past medical history significant for recently diagnosed type 2 diabetes mellitus, presents for his first visit to [facility] cardiology prior to hernia repair surgery.  Patient reports that he had no symptoms and just went for a routine wellness visit a few months ago and was diagnosed with type 2 diabetes mellitus and started on  "metformin.  He lost all his appetite and lost nearly 20 to 30 pounds and metformin dose was reduced to once a day with better tolerability.  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  He reports vague symptoms and a presumptive diagnosis of TIA nearly more than 20 years ago and was given Aggrenox at that time which caused severe headaches and eventually was stopped and no neurological pathology was found.    Past Medical History[1]     Past Surgical History[2]     Social History[3]    Family History[4]     Allergies   Allergen Reactions    Sulfa Antibiotics Other (See Comments)     \"red as a lobster\"    Indomethacin Rash       Current Medications[5]     Lab Results   Component Value Date    CREATININE 0.96 03/03/2025    K 4.8 03/03/2025    SODIUM 142 03/03/2025       I have personally reviewed the ECG from July 1, 2025 and June 27, 2025 which showed normal sinus rhythm with possible old inferior infarct.      REVIEW OF SYSTEMS   Positive for: Inguinal hernia  Negative for: All remaining as reviewed below and in HPI.    SYSTEM SYMPTOMS REVIEWED:  General--weight change, fever, night sweats  Respiratory--cough, wheezing, shortness of breath, sputum production  Cardiovascular--chest pain, syncope, dyspnea on exertion, edema, decline in exercise tolerance, claudication   Gastrointestinal--persistent vomiting, diarrhea, abdominal distention, blood in stool   Muscular or skeletal--joint pain or swelling   Neurologic--headaches, syncope, abnormal movement  Hematologic--history of easy bruising and bleeding   Endocrine--thyroid enlargement, heat or cold intolerance, polyuria   Psychiatric--anxiety, depression     Vitals:    07/07/25 1320   BP: 126/78   Pulse: 71   Temp: 97.5 °F (36.4 °C)   SpO2: 98%   Weight: 82.1 kg (181 lb)   Height: 6' (1.829 m)       Wt Readings from Last 3 Encounters:   07/07/25 82.1 kg (181 lb)   05/21/25 86.2 kg (190 " "lb)   09/30/24 93.4 kg (206 lb)        Body mass index is 24.55 kg/m².     General physical examination:    General appearance: Alert, no acute distress, appears stated age.  Normal weight  HEENT: Mucous membranes are moist.  No obvious abnormality noted.  Neck: Supple with no lymphadenopathy.  No JVD.  Carotid pulses are intact.  No carotid bruit.  Cardiovascular system: Regular rhythm.  Normal S1 and S2.  No murmurs.  No rubs or gallops. Extremities: No edema. No cyanosis.  Pulmonary: Respirations unlabored.  Good air entry bilaterally.  Clear to auscultation bilaterally.  Gastrointestinal: Abdomen is soft and nontender.  Bowel sounds are positive.  Musculoskeletal: Upper Extremities: Normal upper motor strength. Lower Extremity: Normal motor strength. Gait: Normal.   Skin: Skin is warm. No rashes or lesions.  Neurological: Patient is alert and oriented with no gross motor deficits.  Psychiatric: Mood is normal.  Behavior is normal.        Thank you for allowing me to be a part of this patient's care. If you have further questions, please feel free to contact me.    Dc Quinones MD, FACC, DEVANG    Portions of the record  have been created with voice recognition software.  Occasional grammatical mistakes or wrong word or \"sound alike\" substitutions may have occurred due to the inherent limitations of voice recognition software. Please reach out to me directly for any clarifications.          [1]   Past Medical History:  Diagnosis Date    Diabetes (HCC)    [2]   Past Surgical History:  Procedure Laterality Date    ACHILLES TENDON REPAIR Right     approx 2009    APPENDECTOMY      1972   [3]   Social History  Tobacco Use    Smoking status: Never    Smokeless tobacco: Never   Vaping Use    Vaping status: Never Used   Substance Use Topics    Alcohol use: Yes     Comment: rare    Drug use: Never   [4]   Family History  Problem Relation Name Age of Onset    No Known Problems Mother      Bone cancer Father      No Known " Problems Son      No Known Problems Son     [5]   Current Outpatient Medications:     Blood Glucose Monitoring Suppl (ONE TOUCH ULTRA 2) w/Device KIT, use as directed TO TEST ONCE DAILY, Disp: , Rfl:     Lancets (OneTouch Delica Plus [id number]) MISC, use 1 LANCET to TEST BLOOD SUGAR once daily, Disp: , Rfl:     metFORMIN (GLUCOPHAGE) 500 mg tablet, Take 1 tablet by mouth daily with breakfast, Disp: , Rfl:     OneTouch Ultra Test test strip, USE ONE TEST STRIP TO TEST BLOOD SUGAR ONCE DAILY AS DIRECTED BY PRESCRIBER, Disp: , Rfl:

## 2025-07-14 ENCOUNTER — HOSPITAL ENCOUNTER (OUTPATIENT)
Dept: NON INVASIVE DIAGNOSTICS | Facility: HOSPITAL | Age: 65
Discharge: HOME | End: 2025-07-14
Attending: INTERNAL MEDICINE
Payer: COMMERCIAL

## 2025-07-14 VITALS
HEIGHT: 72 IN | HEART RATE: 80 BPM | DIASTOLIC BLOOD PRESSURE: 78 MMHG | BODY MASS INDEX: 24.52 KG/M2 | WEIGHT: 181 LBS | SYSTOLIC BLOOD PRESSURE: 126 MMHG

## 2025-07-14 DIAGNOSIS — R94.31 ABNORMAL EKG: ICD-10-CM

## 2025-07-14 LAB
AORTIC ROOT: 3 CM
ASCENDING AORTA: 3.2 CM
BSA FOR ECHO PROCEDURE: 2.04 M2
E WAVE DECELERATION TIME: 183 MS
E/A RATIO: 1.06
FRACTIONAL SHORTENING: 29 (ref 28–44)
INTERVENTRICULAR SEPTUM IN DIASTOLE (PARASTERNAL SHORT AXIS VIEW): 1 CM
INTERVENTRICULAR SEPTUM: 1 CM (ref 0.6–1.1)
LAAS-AP2: 11.6 CM2
LAAS-AP4: 8.8 CM2
LEFT ATRIUM SIZE: 3.2 CM
LEFT ATRIUM VOLUME (MOD BIPLANE): 23 ML
LEFT ATRIUM VOLUME INDEX (MOD BIPLANE): 11.3 ML/M2
LEFT INTERNAL DIMENSION IN SYSTOLE: 2.7 CM (ref 2.1–4)
LEFT VENTRICULAR INTERNAL DIMENSION IN DIASTOLE: 3.8 CM (ref 3.5–6)
LEFT VENTRICULAR POSTERIOR WALL IN END DIASTOLE: 1.1 CM
LEFT VENTRICULAR STROKE VOLUME: 35 ML
LV EF US.2D.A4C+ESTIMATED: 65 %
LVSV (TEICH): 35 ML
MV E'TISSUE VEL-SEP: 9 CM/S
MV PEAK A VEL: 0.68 M/S
MV PEAK E VEL: 72 CM/S
MV STENOSIS PRESSURE HALF TIME: 53 MS
MV VALVE AREA P 1/2 METHOD: 4.15
RIGHT ATRIUM AREA SYSTOLE A4C: 8.1 CM2
RIGHT VENTRICLE ID DIMENSION: 2.5 CM
SL CV LEFT ATRIUM LENGTH A2C: 4.3 CM
SL CV LV EF: 60
SL CV PED ECHO LEFT VENTRICLE DIASTOLIC VOLUME (MOD BIPLANE) 2D: 63 ML
SL CV PED ECHO LEFT VENTRICLE SYSTOLIC VOLUME (MOD BIPLANE) 2D: 28 ML
TR MAX PG: 22 MMHG
TR PEAK VELOCITY: 2.4 M/S
TRICUSPID ANNULAR PLANE SYSTOLIC EXCURSION: 2.2 CM
TRICUSPID VALVE PEAK REGURGITATION VELOCITY: 2.35 M/S

## 2025-07-14 PROCEDURE — 93306 TTE W/DOPPLER COMPLETE: CPT

## 2025-07-14 PROCEDURE — 93306 TTE W/DOPPLER COMPLETE: CPT | Performed by: INTERNAL MEDICINE

## 2025-07-18 NOTE — PRE-PROCEDURE INSTRUCTIONS
Pre-Surgery Instructions:   Medication Instructions    metFORMIN (GLUCOPHAGE) 500 mg tablet Hold day of surgery.     Spoke with pt & wife via phone.    Medication instructions for day of surgery reviewed. Patient verbalized understanding and agrees with the plan.  Please take all instructed medications with only a sip of water. Please do not take any over the counter (non-prescribed) vitamins or supplements for one week prior to date of surgery.      You will receive a call one business day prior to surgery with an arrival time and hospital directions. If your surgery is scheduled on a Monday, the hospital will be calling you on the Friday prior to your surgery. If you have not heard from anyone by 8pm, please call the hospital supervisor through the hospital  at [phone]. ([place] [phone] or [place] [phone]).    Do not eat or drink anything after midnight the night before your surgery, including candy, mints, lifesavers, or chewing gum. Do not drink alcohol 24hrs before your surgery. Try not to smoke at least 24hrs before your surgery.       Follow the pre surgery showering instructions as listed in the “My Surgical Experience Booklet” or otherwise provided by your surgeon's office. Do not use a blade to shave the surgical area 1 week before surgery. It is okay to use a clean electric clippers up to 24 hours before surgery. Do not apply any lotions, creams, including makeup, cologne, deodorant, or perfumes after showering on the day of your surgery. Do not use dry shampoo, hair spray, hair gel, or any type of hair products.     No contact lenses, eye make-up, or artificial eyelashes. Remove nail polish, including gel polish, and any artificial, gel, or acrylic nails if possible. Remove all jewelry including rings and body piercing jewelry.     Wear causal clothing that is easy to take on and off. Consider your type of surgery.    Keep any valuables, jewelry, piercings at home. Please bring  any specially ordered equipment (sling, braces) if indicated.    Arrange for a responsible person to drive you to and from the hospital on the day of your surgery. Please confirm the visitor policy for the day of your procedure when you receive your phone call with an arrival time.     Call the surgeon's office with any new illnesses, exposures, or additional questions prior to surgery.    Please reference your “My Surgical Experience Booklet” for additional information to prepare for your upcoming surgery.

## 2025-07-24 ENCOUNTER — HOSPITAL ENCOUNTER (OUTPATIENT)
Facility: HOSPITAL | Age: 65
Setting detail: OUTPATIENT SURGERY
Discharge: HOME | End: 2025-07-24
Attending: SURGERY | Admitting: SURGERY
Payer: COMMERCIAL

## 2025-07-24 ENCOUNTER — ANESTHESIA (OUTPATIENT)
Dept: PERIOP | Facility: HOSPITAL | Age: 65
End: 2025-07-24
Payer: COMMERCIAL

## 2025-07-24 ENCOUNTER — ANESTHESIA EVENT (OUTPATIENT)
Dept: PERIOP | Facility: HOSPITAL | Age: 65
End: 2025-07-24
Payer: COMMERCIAL

## 2025-07-24 VITALS
BODY MASS INDEX: 23.98 KG/M2 | OXYGEN SATURATION: 98 % | RESPIRATION RATE: 18 BRPM | DIASTOLIC BLOOD PRESSURE: 84 MMHG | TEMPERATURE: 97 F | HEART RATE: 63 BPM | WEIGHT: 177 LBS | HEIGHT: 72 IN | SYSTOLIC BLOOD PRESSURE: 135 MMHG

## 2025-07-24 DIAGNOSIS — K40.90 LEFT INGUINAL HERNIA: Primary | ICD-10-CM

## 2025-07-24 LAB
GLUCOSE SERPL-MCNC: 150 MG/DL (ref 65–140)
GLUCOSE SERPL-MCNC: 153 MG/DL (ref 65–140)

## 2025-07-24 PROCEDURE — C1781 MESH (IMPLANTABLE): HCPCS | Performed by: SURGERY

## 2025-07-24 PROCEDURE — 49651 LAP ING HERNIA REPAIR RECUR: CPT | Performed by: SURGERY

## 2025-07-24 PROCEDURE — 82948 REAGENT STRIP/BLOOD GLUCOSE: CPT

## 2025-07-24 PROCEDURE — 49651 LAP ING HERNIA REPAIR RECUR: CPT | Performed by: PHYSICIAN ASSISTANT

## 2025-07-24 DEVICE — 3DMAX MESH, 10.8 CM X 16.0 CM (4.3" X 6.3"), LEFT, LARGE
Type: IMPLANTABLE DEVICE | Site: INGUINAL | Status: FUNCTIONAL
Brand: 3DMAX

## 2025-07-24 DEVICE — ETHICON SECURESTRAP ABSORBABLE FIXATION DEVICE
Type: IMPLANTABLE DEVICE | Site: INGUINAL | Status: FUNCTIONAL
Brand: ETHICON SECURESTRAP

## 2025-07-24 RX ORDER — OXYCODONE HYDROCHLORIDE 5 MG/1
7.5 TABLET ORAL EVERY 6 HOURS PRN
Refills: 0 | Status: DISCONTINUED | OUTPATIENT
Start: 2025-07-24 | End: 2025-07-24 | Stop reason: HOSPADM

## 2025-07-24 RX ORDER — ACETAMINOPHEN 325 MG/1
650 TABLET ORAL EVERY 6 HOURS PRN
Status: DISCONTINUED | OUTPATIENT
Start: 2025-07-24 | End: 2025-07-24 | Stop reason: HOSPADM

## 2025-07-24 RX ORDER — LABETALOL HYDROCHLORIDE 5 MG/ML
10 INJECTION, SOLUTION INTRAVENOUS ONCE
Status: COMPLETED | OUTPATIENT
Start: 2025-07-24 | End: 2025-07-24

## 2025-07-24 RX ORDER — LABETALOL HYDROCHLORIDE 5 MG/ML
INJECTION, SOLUTION INTRAVENOUS
Status: COMPLETED
Start: 2025-07-24 | End: 2025-07-24

## 2025-07-24 RX ORDER — GLYCOPYRROLATE 0.2 MG/ML
INJECTION INTRAMUSCULAR; INTRAVENOUS AS NEEDED
Status: DISCONTINUED | OUTPATIENT
Start: 2025-07-24 | End: 2025-07-24

## 2025-07-24 RX ORDER — OXYCODONE HYDROCHLORIDE 5 MG/1
5 TABLET ORAL EVERY 6 HOURS PRN
Refills: 0 | Status: DISCONTINUED | OUTPATIENT
Start: 2025-07-24 | End: 2025-07-24 | Stop reason: HOSPADM

## 2025-07-24 RX ORDER — HYDROMORPHONE HCL IN WATER/PF 6 MG/30 ML
0.2 PATIENT CONTROLLED ANALGESIA SYRINGE INTRAVENOUS
Status: DISCONTINUED | OUTPATIENT
Start: 2025-07-24 | End: 2025-07-24 | Stop reason: HOSPADM

## 2025-07-24 RX ORDER — ONDANSETRON 2 MG/ML
INJECTION INTRAMUSCULAR; INTRAVENOUS AS NEEDED
Status: DISCONTINUED | OUTPATIENT
Start: 2025-07-24 | End: 2025-07-24

## 2025-07-24 RX ORDER — ONDANSETRON 2 MG/ML
4 INJECTION INTRAMUSCULAR; INTRAVENOUS EVERY 6 HOURS PRN
Status: DISCONTINUED | OUTPATIENT
Start: 2025-07-24 | End: 2025-07-24 | Stop reason: HOSPADM

## 2025-07-24 RX ORDER — SODIUM CHLORIDE, SODIUM LACTATE, POTASSIUM CHLORIDE, CALCIUM CHLORIDE 600; 310; 30; 20 MG/100ML; MG/100ML; MG/100ML; MG/100ML
75 INJECTION, SOLUTION INTRAVENOUS CONTINUOUS
Status: DISCONTINUED | OUTPATIENT
Start: 2025-07-24 | End: 2025-07-24 | Stop reason: HOSPADM

## 2025-07-24 RX ORDER — BUPIVACAINE HYDROCHLORIDE 5 MG/ML
INJECTION, SOLUTION EPIDURAL; INTRACAUDAL; PERINEURAL AS NEEDED
Status: DISCONTINUED | OUTPATIENT
Start: 2025-07-24 | End: 2025-07-24 | Stop reason: HOSPADM

## 2025-07-24 RX ORDER — SODIUM CHLORIDE, SODIUM LACTATE, POTASSIUM CHLORIDE, CALCIUM CHLORIDE 600; 310; 30; 20 MG/100ML; MG/100ML; MG/100ML; MG/100ML
125 INJECTION, SOLUTION INTRAVENOUS CONTINUOUS
Status: DISCONTINUED | OUTPATIENT
Start: 2025-07-24 | End: 2025-07-24 | Stop reason: HOSPADM

## 2025-07-24 RX ORDER — FENTANYL CITRATE/PF 50 MCG/ML
50 SYRINGE (ML) INJECTION
Status: DISCONTINUED | OUTPATIENT
Start: 2025-07-24 | End: 2025-07-24 | Stop reason: HOSPADM

## 2025-07-24 RX ORDER — FENTANYL CITRATE 50 UG/ML
INJECTION, SOLUTION INTRAMUSCULAR; INTRAVENOUS AS NEEDED
Status: DISCONTINUED | OUTPATIENT
Start: 2025-07-24 | End: 2025-07-24

## 2025-07-24 RX ORDER — OXYCODONE HYDROCHLORIDE 5 MG/1
5 TABLET ORAL EVERY 6 HOURS PRN
Qty: 15 TABLET | Refills: 0 | Status: SHIPPED | OUTPATIENT
Start: 2025-07-24 | End: 2025-08-03

## 2025-07-24 RX ORDER — ROCURONIUM BROMIDE 10 MG/ML
INJECTION, SOLUTION INTRAVENOUS AS NEEDED
Status: DISCONTINUED | OUTPATIENT
Start: 2025-07-24 | End: 2025-07-24

## 2025-07-24 RX ORDER — MAGNESIUM HYDROXIDE 1200 MG/15ML
LIQUID ORAL AS NEEDED
Status: DISCONTINUED | OUTPATIENT
Start: 2025-07-24 | End: 2025-07-24 | Stop reason: HOSPADM

## 2025-07-24 RX ORDER — CEFAZOLIN SODIUM 2 G/50ML
2000 SOLUTION INTRAVENOUS ONCE
Status: COMPLETED | OUTPATIENT
Start: 2025-07-24 | End: 2025-07-24

## 2025-07-24 RX ORDER — PROPOFOL 10 MG/ML
INJECTION, EMULSION INTRAVENOUS AS NEEDED
Status: DISCONTINUED | OUTPATIENT
Start: 2025-07-24 | End: 2025-07-24

## 2025-07-24 RX ORDER — HYDROMORPHONE HCL/PF 1 MG/ML
0.4 SYRINGE (ML) INJECTION
Status: DISCONTINUED | OUTPATIENT
Start: 2025-07-24 | End: 2025-07-24 | Stop reason: HOSPADM

## 2025-07-24 RX ORDER — LIDOCAINE HYDROCHLORIDE 20 MG/ML
INJECTION, SOLUTION EPIDURAL; INFILTRATION; INTRACAUDAL; PERINEURAL AS NEEDED
Status: DISCONTINUED | OUTPATIENT
Start: 2025-07-24 | End: 2025-07-24

## 2025-07-24 RX ADMIN — LABETALOL HYDROCHLORIDE 10 MG: 5 INJECTION, SOLUTION INTRAVENOUS at 09:04

## 2025-07-24 RX ADMIN — ROCURONIUM BROMIDE 50 MG: 10 INJECTION, SOLUTION INTRAVENOUS at 07:37

## 2025-07-24 RX ADMIN — GLYCOPYRROLATE 0.2 MCG: 0.2 INJECTION INTRAMUSCULAR; INTRAVENOUS at 08:03

## 2025-07-24 RX ADMIN — CEFAZOLIN SODIUM 2000 MG: 2 SOLUTION INTRAVENOUS at 07:42

## 2025-07-24 RX ADMIN — SODIUM CHLORIDE, SODIUM LACTATE, POTASSIUM CHLORIDE, AND CALCIUM CHLORIDE: .6; .31; .03; .02 INJECTION, SOLUTION INTRAVENOUS at 07:29

## 2025-07-24 RX ADMIN — SUGAMMADEX 200 MG: 100 INJECTION, SOLUTION INTRAVENOUS at 08:41

## 2025-07-24 RX ADMIN — LABETALOL HYDROCHLORIDE 10 MG: 5 INJECTION, SOLUTION INTRAVENOUS at 09:34

## 2025-07-24 RX ADMIN — ONDANSETRON 4 MG: 2 INJECTION INTRAMUSCULAR; INTRAVENOUS at 08:31

## 2025-07-24 RX ADMIN — FENTANYL CITRATE 50 MCG: 50 INJECTION, SOLUTION INTRAMUSCULAR; INTRAVENOUS at 08:16

## 2025-07-24 RX ADMIN — FENTANYL CITRATE 50 MCG: 50 INJECTION, SOLUTION INTRAMUSCULAR; INTRAVENOUS at 07:53

## 2025-07-24 RX ADMIN — LIDOCAINE HYDROCHLORIDE 100 MG: 20 INJECTION, SOLUTION EPIDURAL; INFILTRATION; INTRACAUDAL at 07:37

## 2025-07-24 RX ADMIN — PROPOFOL 200 MG: 10 INJECTION, EMULSION INTRAVENOUS at 07:37

## 2025-07-24 RX ADMIN — OXYCODONE HYDROCHLORIDE 5 MG: 5 TABLET ORAL at 10:39

## 2025-07-24 RX ADMIN — ROCURONIUM BROMIDE 20 MG: 10 INJECTION, SOLUTION INTRAVENOUS at 08:11

## 2025-07-24 NOTE — ANESTHESIA POSTPROCEDURE EVALUATION
Post-Op Assessment Note    CV Status:  Stable    Pain management: adequate       Mental Status:  Alert and awake   Hydration Status:  Euvolemic   PONV Controlled:  Controlled   Airway Patency:  Patent     Post Op Vitals Reviewed: Yes    No anethesia notable event occurred.    Staff: Anesthesiologist           Last Filed PACU Vitals:  Vitals Value Taken Time   Temp 98.2 °F (36.8 °C) 07/24/25 09:40   Pulse 76 07/24/25 09:42   /95 07/24/25 09:40   Resp 19 07/24/25 09:42   SpO2 95 % 07/24/25 09:42   Vitals shown include unfiled device data.    Modified Ceiclia:     Vitals Value Taken Time   Activity 2 07/24/25 09:40   Respiration 2 07/24/25 09:40   Circulation 2 07/24/25 09:40   Consciousness 1 07/24/25 09:40   Oxygen Saturation 2 07/24/25 09:40     Modified Cecilia Score: 9

## 2025-07-24 NOTE — ANESTHESIA POSTPROCEDURE EVALUATION
Post-Op Assessment Note    CV Status:  Stable  Pain Score: 0    Pain management: adequate       Mental Status:  Sleepy   Hydration Status:  Stable   PONV Controlled:  None   Airway Patency:  Patent  Two or more mitigation strategies used for obstructive sleep apnea   Post Op Vitals Reviewed: Yes    No anethesia notable event occurred.            Last Filed PACU Vitals:  Vitals Value Taken Time   Temp 98.5 °F (36.9 °C) 07/24/25 08:54   Pulse 84 07/24/25 09:00   /106 07/24/25 08:58   Resp 16 07/24/25 09:00   SpO2 100 % 07/24/25 09:00   Vitals shown include unfiled device data.

## 2025-07-24 NOTE — INTERVAL H&P NOTE
H&P reviewed. After examining the patient I find no changes in the patients condition since the H&P had been written.    Vitals:    07/24/25 0649   BP: 131/95   Pulse: 86   Resp: 16   Temp: 97.7 °F (36.5 °C)   SpO2: 98%

## 2025-07-24 NOTE — PERIOPERATIVE NURSING NOTE
"Darion Carrion PA-C notified of continued swelling and audible \"bowel sounds\" upon palpation of left groin.  Per Darion, this is air trapped in the old hernia site.  WNL.  "

## 2025-07-24 NOTE — OP NOTE
OPERATIVE REPORT  PATIENT NAME: Mj Yates    :  1960  MRN: [id number]  Pt Location: [facility] OR ROOM 01    SURGERY DATE: 2025    Surgeons and Role:     * Meek Leon MD - Primary     * Curtis Carrion PA-C - Assisting  The PA was necessary to provide expert assistance; i.e. in the form of providing optimal exposure with retraction, suturing, and assistance with dissection in order to perform the most efficient operation and in order to optimize patient safety in the abscence of a qualified surgical resident.    Preop Diagnosis:  Recurrent left inguinal hernia [K40.91]    Post-Op Diagnosis Codes:     * Left inguinal hernia [K40.90]    Procedure(s):  Left - REPAIR HERNIA INGUINAL. LAPAROSCOPIC    Specimen(s):  * No specimens in log *    Estimated Blood Loss:   Minimal    Drains:  * No LDAs found *    Anesthesia Type:   General    Operative Indications:  The patient is a pleasant 64-year-old male presenting with signs and symptoms of a left inguinal hernia for which definitive treatment by laparoscopic mesh repair is now indicated.    Operative Findings:  Indirect left inguinal hernia    This was not a recurrent inguinal hernia    Repair completed with large Bard 3D max mesh     Complications:   None    Procedure and Technique:  Patient was taken to the operating room where they were properly identified monitored and anesthetized.  The received antibiotics perioperatively.  Sequential compression device used for deep vein thrombosis prophylaxis.  Marmolejo catheter placed.  Abdomen prepped and draped under sterile conditions using aseptic technique.  Time-out performed.    Skin incised laterally on the abdomen.  5 mm trocar advanced bluntly.  Pneumoperitoneum established to 15 mmHg.  5 mm 30 degree scope advanced.  Four quadrants of the abdomen inspected as was the pelvis.  Two additional working ports placed under direct visualization.  These were a 5 mm periumbilical port and a 11 mm  lateral abdominal wall port.    Patient placed in Trendelenburg.  The inguinal hernia defined.  The peritoneum scored with Harmonic franco beginning at the medial umbilical ligament and extending laterally across the abdominal wall pain careful attention to avoid injury to the inferior epigastric vessels.  Dissection carried laterally to the level of the anterior superior iliac spine.  The peritoneum stripped down lateral to the inguinal hernia.  The iliohypogastric nerve confidently identified and preserved.    Our attention turned to the medial dissection where the bladder was dissected posteriorly and the Willem's ligament identified medially.  Pubic tubercle dissected out to the midline.    Next our attention turned to reduction of the inguinal hernia sac.  Peritoneum stripped down and freed from the spermatic cord laterally and the vas deferens medially.  Careful attention paid during the dissection to avoid injury to the inferior epigastric vessels as well as the iliac artery and vein.    Satisfied that the peritoneum was fully reduced out of the hernia defect a large Bard 3DMax mesh was advanced into the peritoneal cavity and placed over the defect.  It was secured with several absorbable tacks.  The peritoneum was then closed over the mesh and secured with absorbable tacks.    The procedure concluded with closure of the 11 mm defect using the Noe Felix device and an 0 Vicryl suture.  The pneumoperitoneum was released.  The 5 mm trocars removed.  The skin closed with subcuticular 4-0 Monocryl suture.  Wounds infiltrated with 0.5% Marcaine.  Wounds dressed.  The testicles palpated in the scrotum.  The patient extubated and taken to recovery in stable condition.    This procedure was performed on the left inguinal hernia   I was present for the entire procedure.    Patient Disposition:  PACU          SIGNATURE: Meek Leon MD  DATE: July 24, 2025  TIME: 8:48 AM

## 2025-07-24 NOTE — ANESTHESIA PREPROCEDURE EVALUATION
Procedure:  REPAIR HERNIA INGUINAL, LAPAROSCOPIC (Left: Groin)    Relevant Problems   CARDIO   (+) Pure hypercholesterolemia      NEURO/PSYCH   (+) TIA (transient ischemic attack)      PULMONARY   (+) Sleep apnea      Endocrine   (+) Diabetes (HCC)      Other   (+) Abnormal EKG   (+) Recurrent left inguinal hernia        Physical Exam    Airway     Mallampati score: II          Cardiovascular      Dental       Pulmonary      Neurological      Other Findings        Anesthesia Plan  ASA Score- 3     Anesthesia Type- general with ASA Monitors.         Additional Monitors:     Airway Plan: Oral ETT.           Plan Factors-    Chart reviewed.                      Induction- intravenous.    Postoperative Plan- Plan for postoperative opioid use.   Monitoring Plan - Monitoring plan - standard ASA monitoring  Post Operative Pain Plan - non-opiod analgesics and plan for postoperative opioid use        Informed Consent- Anesthetic plan and risks discussed with patient.  I personally reviewed this patient with the CRNA. Discussed and agreed on the Anesthesia Plan with the CRNA..      NPO Status:  Vitals Value Taken Time   Date of last liquid 07/23/25 07/24/25 06:52   Time of last liquid 2100 07/24/25 06:52   Date of last solid 07/23/25 07/24/25 06:52   Time of last solid 2000 07/24/25 06:52

## 2025-07-24 NOTE — DISCHARGE INSTR - AVS FIRST PAGE
Discharge Instructions    Please review material sent to you (or printed for you) through [employer] from [facility] following your surgery. A summary of the most important instructions are provided below and may be more specific to you and the surgery you had.    Light activity for 2 weeks.  No heavy lifting for 2 weeks.  No driving for 5-7 days or until pain is well controlled.  Surgical glue will fall off with time.  Remove any dressings in 2 days.  You may shower starting tomorrow.  Take discharge medications as prescribed.  Notify our office for nausea, vomiting, fever, diarrhea, chest pain, trouble breathing.  Follow up in our office in 2 weeks or sooner if needed.  Call with additional questions or concerns [phone].    For pain you may take ibuprofen 600 mg every 6 hours scheduled (with food) for 3 days then as needed.  You can also take acetaminophen 650 mg every 6 hours scheduled for 3 days then as needed.  For ongoing pain you can alternate ibuprofen and acetaminophen every 3 hours.  If pain not controlled with this regimen you can use Oxycodone as prescribed.  Ice packs may be helpful, 20 min on, 20 min off alternating and monitoring skin.

## 2025-07-24 NOTE — PERIOPERATIVE NURSING NOTE
On assessment of abdomen and groin, soft swelling with crepitus noted in left groin.  Darion Carrion PA-C notified and came to patient's bedside.

## 2025-08-08 ENCOUNTER — OFFICE VISIT (OUTPATIENT)
Dept: SURGERY | Facility: CLINIC | Age: 65
End: 2025-08-08

## 2025-08-08 VITALS — TEMPERATURE: 98.9 F

## 2025-08-08 DIAGNOSIS — Z09 FOLLOW-UP EXAMINATION AFTER ABDOMINAL SURGERY: Primary | ICD-10-CM

## 2025-08-08 PROBLEM — G89.18 POST-OP PAIN: Status: ACTIVE | Noted: 2025-08-08

## 2025-08-08 PROBLEM — G89.18 POST-OP PAIN: Status: RESOLVED | Noted: 2025-08-08 | Resolved: 2025-08-08

## 2025-08-08 PROCEDURE — 99024 POSTOP FOLLOW-UP VISIT: CPT | Performed by: SURGERY

## (undated) DEVICE — TROCAR: Brand: KII FIOS FIRST ENTRY

## (undated) DEVICE — TROCAR SITE CLOSURE DEVICE: Brand: ENDO CLOSE

## (undated) DEVICE — Device

## (undated) DEVICE — GARMENT,MEDLINE,DVT,INT,CALF,FOAM,MED: Brand: MEDLINE

## (undated) DEVICE — 4-PORT MANIFOLD: Brand: NEPTUNE 2

## (undated) DEVICE — ANTIBACTERIAL UNDYED BRAIDED (POLYGLACTIN 910), SYNTHETIC ABSORBABLE SUTURE: Brand: COATED VICRYL

## (undated) DEVICE — TISSUE RETRIEVAL SYSTEM: Brand: INZII RETRIEVAL SYSTEM

## (undated) DEVICE — PACK PBDS LAP CHOLE RF

## (undated) DEVICE — TROCAR: Brand: KII® SLEEVE

## (undated) DEVICE — MICRO HVTSA, 0.5G AND HVTSA SOURCEMARK PRODUCT CODE M1206 AND M1206-01: Brand: EXOFIN MICRO HVTSA, 0.5G

## (undated) DEVICE — CURVED JAW CORDLESS ULTRASONIC DISSECTOR: Brand: SONICISION 7

## (undated) DEVICE — INTENDED FOR TISSUE SEPARATION, AND OTHER PROCEDURES THAT REQUIRE A SHARP SURGICAL BLADE TO PUNCTURE OR CUT.: Brand: BARD-PARKER ® CARBON RIB-BACK BLADES